# Patient Record
Sex: MALE | Race: BLACK OR AFRICAN AMERICAN | NOT HISPANIC OR LATINO | Employment: OTHER | URBAN - METROPOLITAN AREA
[De-identification: names, ages, dates, MRNs, and addresses within clinical notes are randomized per-mention and may not be internally consistent; named-entity substitution may affect disease eponyms.]

---

## 2024-02-15 ENCOUNTER — CLINICAL SUPPORT (OUTPATIENT)
Dept: AUDIOLOGY | Facility: CLINIC | Age: 74
End: 2024-02-15
Payer: COMMERCIAL

## 2024-02-15 DIAGNOSIS — H90.3 SENSORINEURAL HEARING LOSS (SNHL) OF BOTH EARS: Primary | ICD-10-CM

## 2024-02-15 PROCEDURE — 92557 COMPREHENSIVE HEARING TEST: CPT | Performed by: AUDIOLOGIST

## 2024-02-15 NOTE — PROGRESS NOTES
Name: Neymar Montano  YOB: 1950  Age: 74 y.o.    Date of Evaluation:  02/15/2024   Neymar Montano is seen today for an evaluation of hearing.  Neymar reports a history of bilateral hearing loss for a few years. He notes he is constantly saying 'what' during conversations and is interested in pursuing hearing aids. He denies tinnitus, vertigo, and history of ear surgeries.    Evaluation:  Otoscopy revealed clear canals with visible tympanic membranes bilaterally.    Immittance:  Right: Type A middle ear function with normal compliance, peak pressure, and ear canal volume.  Left: Type A middle ear function with normal compliance, peak pressure, and ear canal volume.    Ipsilateral Acoustic Reflexes:  Right: present 500-4000 Hz  Left: present 500-4000 Hz    Behavioral Audiometry:  Right: mild sloping to moderate sensorineural hearing loss 125-8000 Hz. Excellent word understanding (96 %) at 80 dB HL.  Left: mild sloping to moderate sensorineural hearing loss 125-8000 Hz. Excellent word understanding (84 %) at 80 dB HL.    Pure tone averages in agreement with speech reception thresholds.    Results:  Today's results were discussed with the patient indicating a bilateral mild sloping to moderate sensorineural hearing loss. Normal tympanograms and excellent word understanding bilaterally.He is interested in pursuing binaural hearing aids. Neymar was given information to call his insurance company to determine in network provider to use his hearing aid benefit. Given copy of audiogram today.    Treatment Plan:  Follow-up with referring provider  Retest hearing in conjunction with medical management or if a change in hearing occurs  Consider binaural amplification    Time: 2222-6704    PORTER Valles, [facility]  Licensed Audiologist